# Patient Record
Sex: MALE | Race: WHITE | URBAN - METROPOLITAN AREA
[De-identification: names, ages, dates, MRNs, and addresses within clinical notes are randomized per-mention and may not be internally consistent; named-entity substitution may affect disease eponyms.]

---

## 2017-01-01 ENCOUNTER — OFFICE VISIT (OUTPATIENT)
Dept: HEMATOLOGY | Age: 42
End: 2017-01-01

## 2017-01-01 ENCOUNTER — HOSPITAL ENCOUNTER (OUTPATIENT)
Dept: LAB | Age: 42
Discharge: HOME | End: 2017-09-06

## 2017-01-01 VITALS
DIASTOLIC BLOOD PRESSURE: 54 MMHG | SYSTOLIC BLOOD PRESSURE: 118 MMHG | BODY MASS INDEX: 40.43 KG/M2 | TEMPERATURE: 99.8 F | HEIGHT: 74 IN | OXYGEN SATURATION: 96 % | WEIGHT: 315 LBS | HEART RATE: 118 BPM | RESPIRATION RATE: 18 BRPM

## 2017-01-01 DIAGNOSIS — K74.60 CIRRHOSIS OF LIVER WITHOUT ASCITES, UNSPECIFIED HEPATIC CIRRHOSIS TYPE (HCC): Primary | ICD-10-CM

## 2017-01-01 LAB
A1AT SERPL-MCNC: 191 MG/DL (ref 90–200)
ACTIN IGG SERPL-ACNC: 29 UNITS (ref 0–19)
ALBUMIN SERPL-MCNC: 4.4 G/DL (ref 3.5–5.5)
ALP SERPL-CCNC: 121 IU/L (ref 39–117)
ALT SERPL-CCNC: 25 IU/L (ref 0–44)
ANA TITR SER IF: NEGATIVE {TITER}
AST SERPL-CCNC: 38 IU/L (ref 0–40)
BASOPHILS # BLD AUTO: 0 X10E3/UL (ref 0–0.2)
BASOPHILS NFR BLD AUTO: 0 %
BILIRUB DIRECT SERPL-MCNC: 0.3 MG/DL (ref 0–0.4)
BILIRUB SERPL-MCNC: 0.8 MG/DL (ref 0–1.2)
BUN SERPL-MCNC: 4 MG/DL (ref 6–24)
BUN/CREAT SERPL: 7 (ref 9–20)
CALCIUM SERPL-MCNC: 9 MG/DL (ref 8.7–10.2)
CERULOPLASMIN SERPL-MCNC: 30.4 MG/DL (ref 16–31)
CHLORIDE SERPL-SCNC: 98 MMOL/L (ref 96–106)
CO2 SERPL-SCNC: 23 MMOL/L (ref 18–29)
COMMENT, 144067: NORMAL
CREAT SERPL-MCNC: 0.56 MG/DL (ref 0.76–1.27)
EOSINOPHIL # BLD AUTO: 0.1 X10E3/UL (ref 0–0.4)
EOSINOPHIL NFR BLD AUTO: 1 %
ERYTHROCYTE [DISTWIDTH] IN BLOOD BY AUTOMATED COUNT: 14.1 % (ref 12.3–15.4)
FERRITIN SERPL-MCNC: 262 NG/ML (ref 30–400)
GLUCOSE SERPL-MCNC: 104 MG/DL (ref 65–99)
HAV AB SER QL IA: POSITIVE
HBV CORE AB SERPL QL IA: NEGATIVE
HBV SURFACE AB SER QL: NON REACTIVE
HBV SURFACE AG SERPL QL IA: NEGATIVE
HCT VFR BLD AUTO: 42.5 % (ref 37.5–51)
HCV AB S/CO SERPL IA: <0.1 S/CO RATIO (ref 0–0.9)
HGB BLD-MCNC: 14.6 G/DL (ref 12.6–17.7)
IMM GRANULOCYTES # BLD: 0 X10E3/UL (ref 0–0.1)
IMM GRANULOCYTES NFR BLD: 0 %
INR PPP: 1.1 (ref 0.8–1.2)
IRON SATN MFR SERPL: 22 % (ref 15–55)
IRON SERPL-MCNC: 78 UG/DL (ref 38–169)
LYMPHOCYTES # BLD AUTO: 1.8 X10E3/UL (ref 0.7–3.1)
LYMPHOCYTES NFR BLD AUTO: 24 %
MCH RBC QN AUTO: 34.2 PG (ref 26.6–33)
MCHC RBC AUTO-ENTMCNC: 34.4 G/DL (ref 31.5–35.7)
MCV RBC AUTO: 100 FL (ref 79–97)
MONOCYTES # BLD AUTO: 0.6 X10E3/UL (ref 0.1–0.9)
MONOCYTES NFR BLD AUTO: 8 %
NEUTROPHILS # BLD AUTO: 4.9 X10E3/UL (ref 1.4–7)
NEUTROPHILS NFR BLD AUTO: 67 %
PLATELET # BLD AUTO: 119 X10E3/UL (ref 150–379)
POTASSIUM SERPL-SCNC: 4.1 MMOL/L (ref 3.5–5.2)
PROT SERPL-MCNC: 8.7 G/DL (ref 6–8.5)
PROTHROMBIN TIME: 11.7 SEC (ref 9.1–12)
RBC # BLD AUTO: 4.27 X10E6/UL (ref 4.14–5.8)
SODIUM SERPL-SCNC: 141 MMOL/L (ref 134–144)
TIBC SERPL-MCNC: 350 UG/DL (ref 250–450)
UIBC SERPL-MCNC: 272 UG/DL (ref 111–343)
WBC # BLD AUTO: 7.4 X10E3/UL (ref 3.4–10.8)

## 2017-01-01 PROCEDURE — 99001 SPECIMEN HANDLING PT-LAB: CPT | Performed by: INTERNAL MEDICINE

## 2017-01-01 RX ORDER — VITAMIN E 268 MG
CAPSULE ORAL DAILY
COMMUNITY

## 2017-01-01 RX ORDER — IBUPROFEN 200 MG
CAPSULE ORAL
COMMUNITY

## 2017-01-01 RX ORDER — LACTULOSE 10 G/15ML
30 SOLUTION ORAL; RECTAL 2 TIMES DAILY
Qty: 946 ML | Refills: 6 | Status: SHIPPED | OUTPATIENT
Start: 2017-01-01

## 2017-01-01 RX ORDER — SPIRONOLACTONE 25 MG/1
TABLET ORAL DAILY
COMMUNITY
End: 2017-01-01 | Stop reason: SDUPTHER

## 2017-01-01 RX ORDER — FUROSEMIDE 20 MG/1
TABLET ORAL DAILY
COMMUNITY
End: 2017-01-01 | Stop reason: SDUPTHER

## 2017-01-01 RX ORDER — LISINOPRIL 20 MG/1
TABLET ORAL DAILY
COMMUNITY

## 2017-01-01 RX ORDER — IBUPROFEN 600 MG/1
TABLET ORAL
COMMUNITY

## 2017-01-01 RX ORDER — SPIRONOLACTONE 25 MG/1
25 TABLET ORAL DAILY
Qty: 90 TAB | Refills: 3 | Status: SHIPPED | OUTPATIENT
Start: 2017-01-01

## 2017-01-01 RX ORDER — GABAPENTIN 300 MG/1
300 CAPSULE ORAL 3 TIMES DAILY
COMMUNITY

## 2017-01-01 RX ORDER — LACTULOSE 10 G/15ML
30 SOLUTION ORAL; RECTAL 2 TIMES DAILY
Qty: 480 ML | Refills: 0
Start: 2017-01-01 | End: 2017-01-01 | Stop reason: SDUPTHER

## 2017-01-01 RX ORDER — DIAZEPAM 2 MG/1
TABLET ORAL
COMMUNITY

## 2017-01-01 RX ORDER — LACTULOSE 10 G/15ML
SOLUTION ORAL; RECTAL 3 TIMES DAILY
COMMUNITY
End: 2017-01-01 | Stop reason: SDUPTHER

## 2017-01-01 RX ORDER — FUROSEMIDE 20 MG/1
20 TABLET ORAL DAILY
Qty: 90 TAB | Refills: 3 | Status: SHIPPED | OUTPATIENT
Start: 2017-01-01

## 2017-01-01 RX ORDER — BISMUTH SUBSALICYLATE 262 MG
1 TABLET,CHEWABLE ORAL DAILY
COMMUNITY

## 2017-01-01 RX ORDER — PANTOPRAZOLE SODIUM 40 MG/1
40 TABLET, DELAYED RELEASE ORAL DAILY
COMMUNITY

## 2017-09-06 PROBLEM — K74.60 CIRRHOSIS (HCC): Status: ACTIVE | Noted: 2017-01-01

## 2017-09-06 PROBLEM — R18.8 ASCITES: Status: ACTIVE | Noted: 2017-01-01

## 2017-09-06 PROBLEM — F31.9 BIPOLAR DISORDER (HCC): Status: ACTIVE | Noted: 2017-01-01

## 2017-09-06 PROBLEM — F20.9 SCHIZOPHRENIA (HCC): Status: ACTIVE | Noted: 2017-01-01

## 2017-09-06 NOTE — MR AVS SNAPSHOT
Visit Information Date & Time Provider Department Dept. Phone Encounter #  
 9/6/2017  1:30 PM Kalani Kay MD [address] of  [address] [id number] Follow-up Instructions Return in about 3 months (around 12/6/2017) for anoop. Your Appointments 12/6/2017 12:30 PM  
Follow Up with Kalani Kay MD  
[id number] [facility] ([address]) Appt Note: 8wk f/up per Dr. Kayla Ramsey  
 [address], [address] [place] [facility]  
  
   
 [address], [address] [facility] Upcoming Health Maintenance Date Due DTaP/Tdap/Td series (1 - Tdap) 4/24/1994 INFLUENZA AGE 9 TO ADULT 8/1/2017 Allergies as of 9/6/2017  Review Complete On: 9/6/2017 By: Mary Steve No Known Allergies Current Immunizations  Never Reviewed No immunizations on file. Not reviewed this visit You Were Diagnosed With   
  
 Codes Comments Cirrhosis of liver without ascites, unspecified hepatic cirrhosis type ([address]UST 75.)    -  Primary ICD-10-CM: K74.60 ICD-9-CM: 571.5 Vitals BP Pulse Temp Resp Height(growth percentile) 118/54 (BP 1 Location: Right arm, BP Patient Position: Sitting) (!) 118 99.8 °F (37.7 °C) (Tympanic) 18 6' 2\" (1.88 m) Weight(growth percentile) SpO2 BMI Smoking Status 344 lb (156 kg) 96% 44.17 kg/m2 Current Every Day Smoker BMI and BSA Data Body Mass Index Body Surface Area  
 44.17 kg/m 2 2.85 m 2 Preferred Pharmacy Pharmacy Name Phone [facility] - [address] [place] [address] [facility] [address] [phone] Your Updated Medication List  
  
   
This list is accurate as of: 9/6/17  2:39 PM.  Always use your most recent med list.  
  
  
  
  
 diazePAM 2 mg tablet Commonly known as:  VALIUM Take  by mouth every six (6) hours as needed for Anxiety. diphenhydrAMINE 50 mg tablet Commonly known as:  BENADRYL Take 50 mg by mouth nightly as needed for Sleep.  
  
 furosemide 20 mg tablet Commonly known as:  LASIX Take 1 Tab by mouth daily. gabapentin 300 mg capsule Commonly known as:  NEURONTIN Take 300 mg by mouth three (3) times daily. * ibuprofen 600 mg tablet Commonly known as:  MOTRIN Take  by mouth every six (6) hours as needed for Pain. * ibuprofen 200 mg Cap Take  by mouth. lactulose 10 gram/15 mL solution Commonly known as:  Mere Anthony Take 45 mL by mouth two (2) times a day. lisinopril 20 mg tablet Commonly known as:  Vaibhav Arnett Take  by mouth daily. multivitamin tablet Commonly known as:  ONE A DAY Take 1 Tab by mouth daily. pantoprazole 40 mg tablet Commonly known as:  PROTONIX Take 40 mg by mouth daily. spironolactone 25 mg tablet Commonly known as:  ALDACTONE Take 1 Tab by mouth daily. vitamin E 400 unit capsule Commonly known as:  [address] Take  by mouth daily. * Notice: This list has 2 medication(s) that are the same as other medications prescribed for you. Read the directions carefully, and ask your doctor or other care provider to review them with you. Prescriptions Sent to Pharmacy Refills  
 spironolactone (ALDACTONE) 25 mg tablet 3 Sig: Take 1 Tab by mouth daily. Class: Normal  
 Pharmacy: [address] [place] [address] Ph #: [phone] Route: Oral  
 furosemide (LASIX) 20 mg tablet 3 Sig: Take 1 Tab by mouth daily. Class: Normal  
 Pharmacy: [address] [place] [address] Ph #: [phone] Route: Oral  
 lactulose (CHRONULAC) 10 gram/15 mL solution 6 Sig: Take 45 mL by mouth two (2) times a day. Class: Normal  
 Pharmacy: [address] [place] [address] Ph #: [phone]  Route: Oral  
  
 Follow-up Instructions Return in about 3 months (around 12/6/2017) for anoop. To-Do List   
 09/06/2017 Imaging:  US ABD COMP   
  
 10/06/2017 GI:  EGD Introducing [facility]! [facility] introduces [employer] patient portal. Now you can access parts of your medical record, email your doctor's office, and request medication refills online. 1. In your internet browser, go to [email or web address] 2. Click on the First Time User? Click Here link in the Sign In box. You will see the New Member Sign Up page. 3. Enter your [employer] Access Code exactly as it appears below. You will not need to use this code after youve completed the sign-up process. If you do not sign up before the expiration date, you must request a new code. · [employer] Access Code: [email or web address] Expires: 12/5/2017  2:39 PM 
 
4. Enter the last four digits of your Social Security Number (xxxx) and Date of Birth (mm/dd/yyyy) as indicated and click Submit. You will be taken to the next sign-up page. 5. Create a Neon Mobile ID. This will be your [employer] Mobile login ID and cannot be changed, so think of one that is secure and easy to remember. 6. Create a [employer] password. You can change your password at any time. 7. Enter your Password Reset Question and Answer. This can be used at a later time if you forget your password. 8. Enter your e-mail address. You will receive e-mail notification when new information is available in [address] 9. Click Sign Up. You can now view and download portions of your medical record. 10. Click the Download Summary menu link to download a portable copy of your medical information. If you have questions, please visit the Frequently Asked Questions section of the [employer] website. Remember, [employer] is NOT to be used for urgent needs. For medical emergencies, dial 911. Now available from your iPhone and Android! Please provide this summary of care documentation to your next provider. Your primary care clinician is listed as [facility] Kristy. If you have any questions after today's visit, please call [phone].

## 2017-09-06 NOTE — PROGRESS NOTES
[address] MD, Gabriel Pabon, [place], [place]       LARS Fallon PA-C Rodolph Pair, [facility]   LARS Allen NP        at [address], [address] [address]     [phone]     FAX: [phone]    at [address], [id number] [place] [address], [address]     [phone]     FAX: [phone]         Patient Care Team:  Everton Arriaga MD as PCP - General (Family Practice)  Margret Syed MD (Gastroenterology)  Mortimer Reeks, MD (Neurology)      Problem List  Date Reviewed: 9/6/2017          Codes Class Noted    Cirrhosis [facility]) ICD-10-CM: K74.60  ICD-9-CM: 571.5  9/6/2017        Ascites ICD-10-CM: R18.8  ICD-9-CM: 789.59  9/6/2017        Schizophrenia ([facility] [address] ICD-10-CM: F20.9  ICD-9-CM: 295.90  9/6/2017        Bipolar disorder [facility]) ICD-10-CM: F31.9  ICD-9-CM: 296.80  9/6/2017                The physicians listed above have asked me to see Jacquelyn Givens in consultation regarding management of cirrhosis that is secondary to DÍAZ. All medical records sent by the referring physicians were reviewed including imaging studies and pathology. The patient is a 38 y.o.  male who was first found to have chronic liver disease and cirrhosis in 9/2016 when laboratory studies of liver function were noted to be abnormal.    A liver biopsy was performed in 3/2017. This demonstrated portal inflammation, bile duct injury, some steatosis and cirrhosis. The patient has not developed any major complications of cirrhosis to date. Ascites first appeared in 10/2016. Ascites has resolved with diuretics and 1 paracentesis     The patient has not developed edema. The patient has not developed hepatic encephalopathy. He was palced on lactulose because of an elevation in serum ammonia.     The patient has not had been evaluated for varices. The most recent laboratory studies indicate that the liver transaminases are normal, ALP is normal, tests of hepatic synthetic and metabolic function are   normal, and the platelet count is depressed. The patient notes fatigue,     The patient completes all daily activities without any functional limitations. The patient has not experienced pain in the right side over the liver, problems concentrating, swelling of the abdomen, swelling of the lower extremities, hematemesis, hematochezia. ALLERGIES  No Known Allergies    MEDICATIONS  Current Outpatient Prescriptions   Medication Sig    ibuprofen (MOTRIN) 600 mg tablet Take  by mouth every six (6) hours as needed for Pain.  pantoprazole (PROTONIX) 40 mg tablet Take 40 mg by mouth daily.  spironolactone (ALDACTONE) 25 mg tablet Take  by mouth daily.  furosemide (LASIX) 20 mg tablet Take  by mouth daily.  lisinopril (PRINIVIL, ZESTRIL) 20 mg tablet Take  by mouth daily.  gabapentin (NEURONTIN) 300 mg capsule Take 300 mg by mouth three (3) times daily.  vitamin E (AQUA GEMS) 400 unit capsule Take  by mouth daily.  multivitamin (ONE A DAY) tablet Take 1 Tab by mouth daily.  ibuprofen 200 mg cap Take  by mouth.  diazePAM (VALIUM) 2 mg tablet Take  by mouth every six (6) hours as needed for Anxiety.  diphenhydrAMINE (BENADRYL) 50 mg tablet Take 50 mg by mouth nightly as needed for Sleep.  lactulose (CHRONULAC) 10 gram/15 mL solution Take 45 mL by mouth two (2) times a day. No current facility-administered medications for this visit. SYSTEM REVIEW NOT RELATED TO LIVER DISEASE OR REVIEWED ABOVE:  Constitution systems: Negative for fever, chills, weight gain, weight loss. Eyes: Negative for visual changes. ENT: Negative for sore throat, painful swallowing. Respiratory: Negative for cough, hemoptysis, SOB. Cardiology: Negative for chest pain, palpitations.   GI:  Negative for constipation or diarrhea. : Negative for urinary frequency, dysuria, hematuria, nocturia. Skin: Negative for rash. Hematology: Negative for easy bruising, blood clots. Musculo-skelatal: Negative for back pain, muscle pain, weakness. Neurologic: Negative for headaches, dizziness, vertigo, memory problems not related to HE. Psychology: Negative for anxiety, depression. FAMILY HISTORY:  The father  of Parkinson's disease. The mother is alive and healthy. There is no family history of liver disease. SOCIAL HISTORY:  The patient has never been . The patient has no children. The patient currently smokes 6-7 cigarettes daily. The patient has previously consumed alcohol socially never in excess. The patient has been abstinent from alcohol since 2016. The patient is currently receiving disability. PHYSICAL EXAMINATION:  Visit Vitals    /54 (BP 1 Location: Right arm, BP Patient Position: Sitting)    Pulse (!) 118    Temp 99.8 °F (37.7 °C) (Tympanic)    Resp 18    Ht 6' 2\" (1.88 m)    Wt 344 lb (156 kg)    SpO2 96%    BMI 44.17 kg/m2     General: No acute distress. Eyes: Sclera anicteric. ENT: No oral lesions. Thyroid normal.  Nodes: No adenopathy. Skin: No spider angiomata. No jaundice. No palmar erythema. Respiratory: Lungs clear to auscultation. Cardiovascular: Regular heart rate. No murmurs. No JVD. Abdomen: Soft non-tender. Liver size normal to percussion/palpation. Spleen not palpable. No obvious ascites. Extremities: No edema. No muscle wasting. No gross arthritic changes. Neurologic: Alert and oriented. Cranial nerves grossly intact. No asterixis.     LABORATORY STUDIES:  [facility] of [address] [place] [address] & Units 2017   WBC 3.4 - 10.8 x10E3/uL 7.4   ANC 1.4 - 7.0 x10E3/uL 4.9   HGB 12.6 - 17.7 g/dL 14.6    - 379 x10E3/uL 119 (L)   INR 0.8 - 1.2 1.1   AST 0 - 40 IU/L 38   ALT 0 - 44 IU/L 25   Alk Phos 39 - 117 IU/L 121 (H)   Bili, Total 0.0 - 1.2 mg/dL 0.8   Bili, Direct 0.00 - 0.40 mg/dL 0.30   Albumin 3.5 - 5.5 g/dL 4.4   BUN 6 - 24 mg/dL 4 (L)   Creat 0.76 - 1.27 mg/dL 0.56 (L)   Na 134 - 144 mmol/L 141   K 3.5 - 5.2 mmol/L 4.1   Cl 96 - 106 mmol/L 98   CO2 18 - 29 mmol/L 23   Glucose 65 - 99 mg/dL 104 (H)     SEROLOGIES:  Serologies Latest Ref Rng & Units 9/6/2017   Hep A Ab, Total Negative Positive (A)   Hep B Surface Ag Negative Negative   Hep B Core Ab, Total Negative Negative   Hep B Surface AB QL  Non Reactive   Hep C Ab 0.0 - 0.9 s/co ratio <0.1   Ferritin 30 - 400 ng/mL 262   Iron % Saturation 15 - 55 % 22   SHARRI, IFA  Negative   ASMCA 0 - 19 Units 29 (H)   Ceruloplasmin 16.0 - 31.0 mg/dL 30.4   Alpha-1 antitrypsin level 90 - 200 mg/dL 191     LIVER HISTOLOGY:  3/2017. Portal inflammation with feathery degeneration and bile duct proliferation. Cirrhosis. Biopsy slides not reviewed by MLS. ENDOSCOPIC PROCEDURES:  Not available or performed    RADIOLOGY:  9/2016. Ultrasound of liver. Echogenic consistent with cirrhosis. No liver mass lesions. No dilated bile ducts. Moderate ascites. 11/2016. CT scan abdomen with IV contrast.  Changes consistent with cirrhosis. No liver mass lesions. No dilated bile ducts. Moderate ascites. OTHER TESTING:  Not available or performed    ASSESSMENT AND PLAN:  Cirrhosis of unknown etiology. There is a history of alcohol abuse in the past and so alcphol may have contributed to his developing cirrhosis. However, there portal inflamamtion and bile duct changes seen in the biopsy are not consistent with alcohol induced liver disease and are suggestive of PSC or PBC. The patient had a liver biopsy performed in [facility]. We will request that the liver biopsy slides sent be to me for my personal review. The ALP is elevated and the ASMA is positive. This is also consistent with an immune based bile duct disease.     Will perform imaging of the liver with MRI and MRCP. Liver function is normal.  The platelet count is normal.      The CTP is 6. Child class A. The MELD score is 13. Ascites has resolved with current dose of diuretics. Esophageal varices have not been previously assessed for with upper endoscopy. Will schedule for EGD to assess for varices and need for banding. Hepatic encephalopathy has not developed to date. There is no need for treatment with lactulose and/or Xifaxan at this time. No need to restrict dietary protein at this time. The patient was directed to continue all current medications at the current dosages. There are no contraindications for the patient to take any medications that are necessary for treatment of other medical issues. The patient was advised to be abstinent of all alcohol. Thrombocytopenia secondary to cirrhosis. No treatment is necessary. Osteoporosis is common in cirrhosis. Bone density to assess for osteoporosis has not been performed. Vaccination for viral hepatitis B is recommended since the patient has no serologic evidence of previous exposure or vaccination with immunity. Vaccination for viral hepatitis A is not needed. The patient has serologic evidence of prior exposure or vaccination with immunity. [address] screening has recently been performed and does not suggest [address] The next liver imaging study will be performed in 1/2018. All of the above issues were discussed with the patient. All questions were answered. The patient expressed a clear understanding of the above. [address] [address] in 4 weeks to review all data and develop a treatment plan.     Mark Beckford MD  [facility] [place] of [address] [place] [address] [place]ST, [address] [place] [address], Formerly named [facility] [address]  [phone]

## 2017-09-06 NOTE — PROGRESS NOTES
Michelle Garcia is a 38 y.o. male    No chief complaint on file. 1. Have you been to the ER, urgent care clinic or hospitalized since your last visit? NO.     2. Have you seen or consulted any other health care providers outside of the [address] [place] [address] since your last visit (Include any pap smears or colon screening)?  NO      Learning Assessment 9/6/2017   PRIMARY LEARNER Patient   BARRIERS PRIMARY LEARNER NONE   CO-LEARNER CAREGIVER No   PRIMARY LANGUAGE ENGLISH   LEARNER PREFERENCE PRIMARY VIDEOS   ANSWERED BY patient   RELATIONSHIP SELF